# Patient Record
Sex: FEMALE | Race: WHITE
[De-identification: names, ages, dates, MRNs, and addresses within clinical notes are randomized per-mention and may not be internally consistent; named-entity substitution may affect disease eponyms.]

---

## 2017-02-13 ENCOUNTER — HOSPITAL ENCOUNTER (OUTPATIENT)
Age: 62
Discharge: HOME | End: 2017-02-13
Payer: COMMERCIAL

## 2017-02-13 DIAGNOSIS — R30.0: Primary | ICD-10-CM

## 2017-03-13 ENCOUNTER — HOSPITAL ENCOUNTER (OUTPATIENT)
Age: 62
Discharge: HOME | End: 2017-03-13
Payer: COMMERCIAL

## 2017-03-13 DIAGNOSIS — N39.0: Primary | ICD-10-CM

## 2017-11-08 ENCOUNTER — HOSPITAL ENCOUNTER (OUTPATIENT)
Dept: HOSPITAL 76 - DI.S | Age: 62
Discharge: HOME | End: 2017-11-08
Attending: NURSE PRACTITIONER
Payer: COMMERCIAL

## 2017-11-08 DIAGNOSIS — Z12.31: Primary | ICD-10-CM

## 2017-11-08 PROCEDURE — 77067 SCR MAMMO BI INCL CAD: CPT

## 2017-11-24 NOTE — MAMMOGRAPHY REPORT
DIGITAL SCREENING MAMMOGRAM: 11/08/2017 

 

CLINICAL INDICATION: A 62-year-old, for screening. 

 

COMPARISON: The patient reports having had previous mammograms in [place], but films are not yet availa
ble for direct comparison. If they become available, an addendum will be issued. 

 

TECHNIQUE:  Routine CC and MLO projections were obtained of the breasts. Bilateral laterally exaggera
jake craniocaudal views. 

 

FINDINGS:  The breasts demonstrate scattered fibroglandular densities bilaterally. A few punctate, ty
pically benign calcifications are present. No suspicious masses, clustered microcalcifications, or re
gions of architectural distortion are identified.  

 

IMPRESSION:  BENIGN FINDINGS. 

 

RECOMMENDATION: ROUTINE ANNUAL SCREENING UNLESS OTHERWISE CLINICALLY INDICATED. 

 

BIRADS CATEGORY 2-BENIGN FINDINGS. 

 

STANDARD QUALIFYING STATEMENTS

 

1. This examination was reviewed with the aid of Computer-Aided Detection (CAD).

 

2. A negative or benign imaging report should not delay biopsy if clinically suspicious findings are 
present. Consider surgical consultation if warranted. More than 5% of cancers are not identified by i
maging.

 

3. Dense breasts may obscure an underlying neoplasm.

 

 

 

DD:11/24/2017 10:57:50  DT: 11/24/2017 11:15  JOB #: [id number]  EXT JOB #:[id number]

## 2017-12-18 ENCOUNTER — HOSPITAL ENCOUNTER (OUTPATIENT)
Dept: HOSPITAL 76 - SDS | Age: 62
Discharge: HOME | End: 2017-12-18
Attending: SURGERY
Payer: COMMERCIAL

## 2017-12-18 VITALS — DIASTOLIC BLOOD PRESSURE: 51 MMHG | SYSTOLIC BLOOD PRESSURE: 105 MMHG

## 2017-12-18 DIAGNOSIS — K57.30: ICD-10-CM

## 2017-12-18 DIAGNOSIS — K64.8: ICD-10-CM

## 2017-12-18 DIAGNOSIS — Z12.11: Primary | ICD-10-CM

## 2017-12-18 PROCEDURE — 0DJD8ZZ INSPECTION OF LOWER INTESTINAL TRACT, VIA NATURAL OR ARTIFICIAL OPENING ENDOSCOPIC: ICD-10-PCS | Performed by: SURGERY

## 2017-12-18 PROCEDURE — 45378 DIAGNOSTIC COLONOSCOPY: CPT

## 2022-05-10 ENCOUNTER — HOSPITAL ENCOUNTER (OUTPATIENT)
Dept: HOSPITAL 76 - DI.S | Age: 67
Discharge: HOME | End: 2022-05-10
Attending: GENERAL ACUTE CARE HOSPITAL
Payer: COMMERCIAL

## 2022-05-10 DIAGNOSIS — Z12.31: Primary | ICD-10-CM

## 2022-05-11 NOTE — MAMMOGRAPHY REPORT
BILATERAL DIGITAL SCREENING MAMMOGRAM 3D/2D: 5/10/2022

 

CLINICAL: Routine screening.  

 

Comparison is made to exam dated:  11/8/2017 mammogram - [facility].  There are sca
ttered fibroglandular elements in both breasts.  

 

No significant masses, calcifications, or other findings are seen in either breast.  

There has been no significant interval change.

 

IMPRESSION: NEGATIVE

There is no mammographic evidence of malignancy. A 1 year screening mammogram is recommended.  

 

 

This exam was interpreted at Station ID: [id number].  

 

NOTE: For mammograms, a report in lay terms will be sent to the patient. Approximately 15% of breast 
malignancies will not be visualized mammographically. In the management of a palpable breast mass, a 
negative mammogram must not discourage biopsy of a clinically suspicious lesion.

 

Electronically Signed By: Khadijah polo/kelvin:5/11/2022 14:04:59  

 

 

 

ACR BI-RADS Category 1: Negative 3341F

PARENCHYMAL PATTERN: (A) - The breast(s) demonstrate(s) scattered fibroglandular densities.

BI-RADS CATEGORY: (1) - 1

RECOMMENDATION: (ANNUAL)  - Recommend routine annual screening mammography.

[id number]

1 year screening

LATERALITY: (B)

## 2022-12-29 ENCOUNTER — HOSPITAL ENCOUNTER (OUTPATIENT)
Dept: HOSPITAL 76 - LAB.S | Age: 67
Discharge: HOME | End: 2022-12-29
Attending: NURSE PRACTITIONER
Payer: COMMERCIAL

## 2022-12-29 DIAGNOSIS — Z13.220: ICD-10-CM

## 2022-12-29 DIAGNOSIS — Z00.00: Primary | ICD-10-CM

## 2022-12-29 DIAGNOSIS — L40.9: ICD-10-CM

## 2022-12-29 LAB
ALBUMIN DIAFP-MCNC: 4.2 G/DL (ref 3.2–5.5)
ALBUMIN/GLOB SERPL: 1.3 {RATIO} (ref 1–2.2)
ALP SERPL-CCNC: 86 IU/L (ref 42–121)
ALT SERPL W P-5'-P-CCNC: 20 IU/L (ref 10–60)
ANION GAP SERPL CALCULATED.4IONS-SCNC: 6 MMOL/L (ref 6–13)
AST SERPL W P-5'-P-CCNC: 17 IU/L (ref 10–42)
BASOPHILS NFR BLD AUTO: 0.1 10^3/UL (ref 0–0.1)
BASOPHILS NFR BLD AUTO: 0.8 %
BILIRUB BLD-MCNC: 0.8 MG/DL (ref 0.2–1)
BUN SERPL-MCNC: 17 MG/DL (ref 6–20)
CALCIUM UR-MCNC: 9.5 MG/DL (ref 8.5–10.3)
CHLORIDE SERPL-SCNC: 102 MMOL/L (ref 101–111)
CHOLEST SERPL-MCNC: 256 MG/DL
CO2 SERPL-SCNC: 30 MMOL/L (ref 21–32)
CREAT SERPLBLD-SCNC: 0.9 MG/DL (ref 0.4–1)
EOSINOPHIL # BLD AUTO: 0.1 10^3/UL (ref 0–0.7)
EOSINOPHIL NFR BLD AUTO: 1.8 %
ERYTHROCYTE [DISTWIDTH] IN BLOOD BY AUTOMATED COUNT: 12.1 % (ref 12–15)
GFRSERPLBLD MDRD-ARVRAT: 62 ML/MIN/{1.73_M2} (ref 89–?)
GLOBULIN SER-MCNC: 3.2 G/DL (ref 2.1–4.2)
GLUCOSE SERPL-MCNC: 132 MG/DL (ref 70–100)
HCT VFR BLD AUTO: 44.2 % (ref 37–47)
HDLC SERPL-MCNC: 50 MG/DL
HDLC SERPL: 5.1 {RATIO} (ref ?–4.4)
HGB UR QL STRIP: 13.6 G/DL (ref 12–16)
LDLC SERPL CALC-MCNC: 154 MG/DL
LDLC/HDLC SERPL: 3.1 {RATIO} (ref ?–4.4)
LYMPHOCYTES # SPEC AUTO: 1.7 10^3/UL (ref 1.5–3.5)
LYMPHOCYTES NFR BLD AUTO: 21.9 %
MCH RBC QN AUTO: 28.9 PG (ref 27–31)
MCHC RBC AUTO-ENTMCNC: 30.8 G/DL (ref 32–36)
MCV RBC AUTO: 94 FL (ref 81–99)
MONOCYTES # BLD AUTO: 0.6 10^3/UL (ref 0–1)
MONOCYTES NFR BLD AUTO: 7 %
NEUTROPHILS # BLD AUTO: 5.3 10^3/UL (ref 1.5–6.6)
NEUTROPHILS # SNV AUTO: 7.9 X10^3/UL (ref 4.8–10.8)
NEUTROPHILS NFR BLD AUTO: 68 %
NRBC # BLD AUTO: 0 /100WBC
NRBC # BLD AUTO: 0 X10^3/UL
PDW BLD AUTO: 10.4 FL (ref 7.9–10.8)
PLATELET # BLD: 285 10^3/UL (ref 130–450)
POTASSIUM SERPL-SCNC: 4.2 MMOL/L (ref 3.5–5)
PROT SPEC-MCNC: 7.4 G/DL (ref 6.7–8.2)
RBC MAR: 4.7 10^6/UL (ref 4.2–5.4)
SODIUM SERPLBLD-SCNC: 138 MMOL/L (ref 135–145)
TRIGL P FAST SERPL-MCNC: 261 MG/DL
TSH SERPL-ACNC: 1.91 UIU/ML (ref 0.34–5.6)
VLDLC SERPL-SCNC: 52 MG/DL

## 2022-12-29 PROCEDURE — 80053 COMPREHEN METABOLIC PANEL: CPT

## 2022-12-29 PROCEDURE — 85025 COMPLETE CBC W/AUTO DIFF WBC: CPT

## 2022-12-29 PROCEDURE — 83721 ASSAY OF BLOOD LIPOPROTEIN: CPT

## 2022-12-29 PROCEDURE — 36415 COLL VENOUS BLD VENIPUNCTURE: CPT

## 2022-12-29 PROCEDURE — 80061 LIPID PANEL: CPT

## 2022-12-29 PROCEDURE — 84443 ASSAY THYROID STIM HORMONE: CPT

## 2023-03-29 ENCOUNTER — HOSPITAL ENCOUNTER (OUTPATIENT)
Dept: HOSPITAL 76 - DI.S | Age: 68
Discharge: HOME | End: 2023-03-29
Attending: PHYSICIAN ASSISTANT
Payer: COMMERCIAL

## 2023-03-29 DIAGNOSIS — M19.042: ICD-10-CM

## 2023-03-29 DIAGNOSIS — M19.031: ICD-10-CM

## 2023-03-29 DIAGNOSIS — S52.502A: ICD-10-CM

## 2023-03-29 DIAGNOSIS — S63.8X2A: Primary | ICD-10-CM

## 2023-03-29 DIAGNOSIS — S63.8X1A: ICD-10-CM

## 2023-03-29 DIAGNOSIS — M19.032: ICD-10-CM

## 2023-03-29 DIAGNOSIS — M19.041: ICD-10-CM

## 2023-03-29 NOTE — XRAY REPORT
PROCEDURE:  Hand 3 View LT

 

INDICATIONS:  LEFT HAND PAIN

 

TECHNIQUE:  3 views of the hand(s) acquired.  

 

COMPARISON:  None

 

FINDINGS:  

 

Bones: Comminuted and impacted intra-articular fracture of distal radius is seen. No acute fracture o
r dislocation is seen in left hand. Moderate to severe osteoarthritic changes throughout left hand ar
e seen. No suspicious bony lesions.  

 

Soft tissues:  No suspicious soft tissue calcifications.  

 

IMPRESSION:  

No acute left hand fracture or dislocation. Osteoarthritic changes throughout left hand joints. Commi
nuted and impacted distal radial fracture.

 

Reviewed by: Adam Cerrato MD on 3/29/2023 10:20 AM PDT

Approved by: Adam Cerrato MD on 3/29/2023 10:20 AM PDT

 

 

Station ID:  [id number]

## 2023-03-29 NOTE — XRAY REPORT
PROCEDURE:  Wrist 4 View RT

 

INDICATIONS: RIGHT WRIST PAIN

 

TECHNIQUE:  4 views of the wrist were acquired.  

 

COMPARISON:  None

 

FINDINGS:  

 

Bones:  No fractures or dislocations. Moderate osteoarthritic changes are noted throughout right wris
t most notably at first CMC joint and scaphotrapezial joint. No suspicious bony lesions.  

 

Scaphoid view:  Scaphoid is grossly intact. No evidence of osteonecrosis.

 

Soft tissues:  No suspicious soft tissue calcifications.  

 

IMPRESSION:  

No acute wrist fracture or dislocation. Osteoarthritis throughout wrist joints most notably at first 
CMC joint and scaphotrapezial joint.

 

Reviewed by: Adam Cerrato MD on 3/29/2023 10:12 AM PDT

Approved by: Adam Cerrato MD on 3/29/2023 10:12 AM PDT

 

 

Station ID:  [id number]

## 2023-03-29 NOTE — XRAY REPORT
PROCEDURE:  Wrist 4 View LT

 

INDICATIONS: LEFT WRIST PAIN

 

TECHNIQUE:  4 views of the wrist were acquired.  

 

COMPARISON:  None

 

FINDINGS:  

 

Bones: Acute comminuted fractures involving distal radius is seen with impaction at distal radial sha
ft fracture site, volar and lateral displacement of fractured fragments and fracture line extending t
o radiocarpal joint space. Osteoarthritic changes are noted throughout wrist joints. No other fractur
e or dislocation is noted. No suspicious bony lesions.  

 

Scaphoid view:  Scaphoid is grossly intact.

 

Soft tissues:  No suspicious soft tissue calcifications.  

 

IMPRESSION:  

Acute comminuted, impacted and displaced intra-articular fracture of distal radius as above. No other
 fracture or dislocation. Left wrist joint osteoarthritis.

 

Reviewed by: Adma Cerrato MD on 3/29/2023 10:13 AM PDT

Approved by: Adam Cerrato MD on 3/29/2023 10:13 AM PDT

 

 

Station ID:  [id number]

## 2023-03-29 NOTE — XRAY REPORT
PROCEDURE:  Hand 3 View RT

 

INDICATIONS:  RIGHT HAND PAIN

 

TECHNIQUE:  3 views of the hand(s) acquired.  

 

COMPARISON:  None

 

FINDINGS:  

 

Bones:  No fractures or dislocations.  Osteoarthritic changes are noted throughout right-hand and wri
st joints. No suspicious bony lesions.  

 

Soft tissues:  No suspicious soft tissue calcifications.  Soft tissue swelling over dorsal aspect of 
metacarpal bones is seen.

 

IMPRESSION:  

Right hand and wrist joint osteophyte is. No acute right hand fracture or dislocation. Dorsal soft ti
ssue swelling.

 

Reviewed by: Adam Cerrato MD on 3/29/2023 10:14 AM PDT

Approved by: Adam Cerrato MD on 3/29/2023 10:14 AM PDT

 

 

Station ID:  [id number]

## 2023-04-03 ENCOUNTER — HOSPITAL ENCOUNTER (OUTPATIENT)
Dept: HOSPITAL 76 - DI.S | Age: 68
Discharge: HOME | End: 2023-04-03
Attending: ORTHOPAEDIC SURGERY
Payer: MEDICARE

## 2023-04-03 DIAGNOSIS — S62.616A: ICD-10-CM

## 2023-04-03 DIAGNOSIS — M18.0: ICD-10-CM

## 2023-04-03 DIAGNOSIS — S63.8X1A: ICD-10-CM

## 2023-04-03 DIAGNOSIS — M19.032: ICD-10-CM

## 2023-04-03 DIAGNOSIS — M19.031: ICD-10-CM

## 2023-04-03 DIAGNOSIS — S52.572A: Primary | ICD-10-CM

## 2023-04-03 NOTE — XRAY REPORT
PROCEDURE:  Wrist 4 View BILAT

 

INDICATIONS: UNSPECIFIED FRACTURE OF THE LOWER END OF LEFT RADI

 

TECHNIQUE:  4 views of each of the bilateral wrist were acquired.  

 

COMPARISON:  3/29/2023

 

FINDINGS:  

 

Bones:  Splinted views of the left wrist were acquired. Splint material obscures fine underlying osse
ous details. Redemonstration of moderately comminuted, intra-articular fracture of the distal left ra
dius with mild impaction and volar angulation. This has not significantly changed. Stable appearance 
of severe degenerative changes of the left wrist predominantly involving the first carpometacarpal brandie
int and triscaphe joint.

 

Right wrist demonstrate stable alignment. No acute or subacute fractures identified. Polyarticular de
generative changes of the right wrist appear stable. Findings are also most pronounced in the first c
arpal metacarpal joint and triscaphe joint.

 

Soft tissues:  No suspicious soft tissue calcifications or masses.  

 

IMPRESSION:  

 

1. Stable appearance and alignment of comminuted, mildly impacted, and displaced fracture of the dist
al left radius. Intra-articular extension as before.

2. Stable radiographic appearance of the right wrist without evidence for acute fracture, dislocation
, or reactive changes of subacute fracture healing.

3. Polyarticular degenerative changes of the bilateral wrists which appear most severe in the first c
arpometacarpal joint and triscaphe joints bilaterally.

 

 

 

Reviewed by: Marcelino Cadet MD on 4/3/2023 4:03 PM PDT

Approved by: Marcelino Cadet MD on 4/3/2023 4:03 PM PDT

 

 

Station ID:  [id number]

## 2023-04-03 NOTE — XRAY REPORT
PROCEDURE:  Hand 3 View RT

 

INDICATIONS:  NONDISPLACED FRACTURE OF DISTAL PHALANX OF R LITTL

 

TECHNIQUE:  3 views of the hand(s) acquired.  

 

COMPARISON:  X-ray hand 3/29/2023

 

FINDINGS:  

 

Bones:  There is a prominent osteophyte at the fifth IP joint with appearance of possible fracture ne
ar the origin. In addition, there is an offset appearance at the base of the distal fifth phalanx.. N
o suspicious bony lesions.   Scattered IP degenerative narrowing.

 

Soft tissues:  No suspicious soft tissue calcifications or masses.  

 

IMPRESSION:  

Distal fifth phalanx base fracture unchanged compared to prior exam. In addition, prominent osteophyt
e is present with possible fracture at the origin.

 

 

 

Reviewed by: Nely Vallejo MD on 4/3/2023 2:32 PM PDT

Approved by: Nely Vallejo MD on 4/3/2023 2:32 PM PDT

 

 

Station ID:  [id number]

## 2023-04-11 ENCOUNTER — HOSPITAL ENCOUNTER (OUTPATIENT)
Dept: HOSPITAL 76 - DI.WOS | Age: 68
Discharge: HOME | End: 2023-04-11
Attending: ORTHOPAEDIC SURGERY
Payer: MEDICARE

## 2023-04-11 DIAGNOSIS — S52.532D: Primary | ICD-10-CM

## 2023-04-11 NOTE — XRAY REPORT
PROCEDURE:  Wrist 3 View LT

 

INDICATIONS: LEFT WRIST FRACTURE

 

TECHNIQUE:  3 views of the wrist were acquired.  

 

COMPARISON:  Left wrist radiographs 4/3/2023

 

FINDINGS:  

 

Bones:  Impacted comminuted intra-articular fracture at the distal radius is again seen. The alignmen
t does not appear significantly changed compared to the prior radiographs. No new osseous abnormality
. Background degenerative changes and osteopenia again noted.

 

Soft tissues:  Soft tissue edema seen surrounding the wrist.

 

IMPRESSION:  

Comminuted mildly impacted intra-articular fracture of the distal radius is redemonstrated with uncha
nged alignment.

 

 

 

Reviewed by: Armando Manley MD on 4/11/2023 3:17 PM PDT

Approved by: Armando Manley MD on 4/11/2023 3:17 PM PDT

 

 

Station ID:  [id number]

## 2023-05-16 ENCOUNTER — HOSPITAL ENCOUNTER (OUTPATIENT)
Dept: HOSPITAL 76 - DI.WOS | Age: 68
Discharge: HOME | End: 2023-05-16
Attending: ORTHOPAEDIC SURGERY
Payer: MEDICARE

## 2023-05-16 DIAGNOSIS — S62.666D: ICD-10-CM

## 2023-05-16 DIAGNOSIS — S52.532D: Primary | ICD-10-CM

## 2023-05-16 DIAGNOSIS — M19.041: ICD-10-CM

## 2023-05-16 NOTE — XRAY REPORT
PROCEDURE:  Hand 3 View RT

 

INDICATIONS:  RIGHT HAND FX

 

TECHNIQUE:  3 views of the hand(s) acquired.  

 

COMPARISON:  4/3/2023

 

FINDINGS:  

 

Bones:  Similar appearance of the nondisplaced fracture of the dorsal base of the fifth digit distal 
phalanx. Polyarticular degenerative changes of the hand redemonstrated. Appearance of a sclerotic ban
d at the distal radial metaphysis could indicate a subacute fracture.

 

Soft tissues:  No suspicious soft tissue calcifications .  

 

 

IMPRESSION:  

Similar appearance of the nondisplaced fracture of the dorsal base of the fifth digit distal phalanx.


Appearance of a sclerotic band at the distal radial metaphysis could indicate a subacute fracture.

 

 

Reviewed by: Armando Abrams MD on 5/16/2023 9:19 PM PDT

Approved by: Armando Abrams MD on 5/16/2023 9:19 PM PDT

 

 

Station ID:  [id number]

## 2023-05-16 NOTE — XRAY REPORT
PROCEDURE:  Wrist 3 View LT

 

INDICATIONS: LEFT WRIST FX

 

TECHNIQUE:  3 views of the wrist were acquired.  

 

COMPARISON:  4/11/2023

 

FINDINGS:  

 

Bones:  Similar alignment of the previously demonstrated distal radius fracture with mild volar angul
ation of the distal fragment. Bony callus is present about the fracture plane.

 

Soft tissues:  No suspicious soft tissue calcifications.  

 

 

IMPRESSION:  

Similar alignment of the previously demonstrated distal radius fracture.

 

 

 

Reviewed by: Armando Abrams MD on 5/16/2023 9:00 PM PDT

Approved by: Armando Abrams MD on 5/16/2023 9:00 PM PDT

 

 

Station ID:  [id number]